# Patient Record
Sex: MALE | Race: WHITE | NOT HISPANIC OR LATINO | URBAN - NONMETROPOLITAN AREA
[De-identification: names, ages, dates, MRNs, and addresses within clinical notes are randomized per-mention and may not be internally consistent; named-entity substitution may affect disease eponyms.]

---

## 2019-12-31 ENCOUNTER — OFFICE VISIT (OUTPATIENT)
Dept: URGENT CARE | Facility: PHYSICIAN GROUP | Age: 19
End: 2019-12-31
Payer: COMMERCIAL

## 2019-12-31 VITALS
WEIGHT: 298 LBS | DIASTOLIC BLOOD PRESSURE: 86 MMHG | TEMPERATURE: 98.4 F | HEIGHT: 75 IN | OXYGEN SATURATION: 98 % | RESPIRATION RATE: 16 BRPM | HEART RATE: 76 BPM | SYSTOLIC BLOOD PRESSURE: 118 MMHG | BODY MASS INDEX: 37.05 KG/M2

## 2019-12-31 DIAGNOSIS — J22 LOWER RESP. TRACT INFECTION: ICD-10-CM

## 2019-12-31 DIAGNOSIS — J06.9 VIRAL UPPER RESPIRATORY TRACT INFECTION: ICD-10-CM

## 2019-12-31 PROCEDURE — 99204 OFFICE O/P NEW MOD 45 MIN: CPT | Performed by: NURSE PRACTITIONER

## 2019-12-31 RX ORDER — GUAIFENESIN 600 MG/1
600 TABLET, EXTENDED RELEASE ORAL EVERY 12 HOURS
Qty: 28 TAB | Refills: 0 | Status: SHIPPED | OUTPATIENT
Start: 2019-12-31

## 2019-12-31 RX ORDER — AZITHROMYCIN 250 MG/1
TABLET, FILM COATED ORAL
Qty: 6 TAB | Refills: 0 | Status: SHIPPED | OUTPATIENT
Start: 2019-12-31

## 2019-12-31 ASSESSMENT — ENCOUNTER SYMPTOMS
RHINORRHEA: 1
WHEEZING: 0
COUGH: 1
SHORTNESS OF BREATH: 1
FEVER: 0
HEMOPTYSIS: 0
SORE THROAT: 1

## 2020-01-01 NOTE — PATIENT INSTRUCTIONS
"Upper Respiratory Infection, Adult  Most upper respiratory infections (URIs) are caused by a virus. A URI affects the nose, throat, and upper air passages. The most common type of URI is often called \"the common cold.\"  Follow these instructions at home:  · Take medicines only as told by your doctor.  · Gargle warm saltwater or take cough drops to comfort your throat as told by your doctor.  · Use a warm mist humidifier or inhale steam from a shower to increase air moisture. This may make it easier to breathe.  · Drink enough fluid to keep your pee (urine) clear or pale yellow.  · Eat soups and other clear broths.  · Have a healthy diet.  · Rest as needed.  · Go back to work when your fever is gone or your doctor says it is okay.  ¨ You may need to stay home longer to avoid giving your URI to others.  ¨ You can also wear a face mask and wash your hands often to prevent spread of the virus.  · Use your inhaler more if you have asthma.  · Do not use any tobacco products, including cigarettes, chewing tobacco, or electronic cigarettes. If you need help quitting, ask your doctor.  Contact a doctor if:  · You are getting worse, not better.  · Your symptoms are not helped by medicine.  · You have chills.  · You are getting more short of breath.  · You have brown or red mucus.  · You have yellow or brown discharge from your nose.  · You have pain in your face, especially when you bend forward.  · You have a fever.  · You have puffy (swollen) neck glands.  · You have pain while swallowing.  · You have white areas in the back of your throat.  Get help right away if:  · You have very bad or constant:  ¨ Headache.  ¨ Ear pain.  ¨ Pain in your forehead, behind your eyes, and over your cheekbones (sinus pain).  ¨ Chest pain.  · You have long-lasting (chronic) lung disease and any of the following:  ¨ Wheezing.  ¨ Long-lasting cough.  ¨ Coughing up blood.  ¨ A change in your usual mucus.  · You have a stiff neck.  · You have " changes in your:  ¨ Vision.  ¨ Hearing.  ¨ Thinking.  ¨ Mood.  This information is not intended to replace advice given to you by your health care provider. Make sure you discuss any questions you have with your health care provider.  Document Released: 06/05/2009 Document Revised: 08/20/2017 Document Reviewed: 03/25/2015  [employer] Patient Education © 2017 [employer]      Bronchitis  Bronchitis is the body's way of reacting to injury and/or infection (inflammation) of the bronchi. Bronchi are the air tubes that extend from the windpipe into the lungs. If the inflammation becomes severe, it may cause shortness of breath.  CAUSES   Inflammation may be caused by:  · A virus.  · Germs (bacteria).  · Dust.  · Allergens.  · Pollutants and many other irritants.  The cells lining the bronchial tree are covered with tiny hairs (cilia). These constantly beat upward, away from the lungs, toward the mouth. This keeps the lungs free of pollutants. When these cells become too irritated and are unable to do their job, mucus begins to develop. This causes the characteristic cough of bronchitis. The cough clears the lungs when the cilia are unable to do their job. Without either of these protective mechanisms, the mucus would settle in the lungs. Then you would develop pneumonia.  Smoking is a common cause of bronchitis and can contribute to pneumonia. Stopping this habit is the single most important thing you can do to help yourself.  TREATMENT   · Your caregiver may prescribe an antibiotic if the cough is caused by bacteria. Also, medicines that open up your airways make it easier to breathe. Your caregiver may also recommend or prescribe an expectorant. It will loosen the mucus to be coughed up. Only take over-the-counter or prescription medicines for pain, discomfort, or fever as directed by your caregiver.  · Removing whatever causes the problem (smoking, for example) is critical to preventing the problem from  getting worse.  · Cough suppressants may be prescribed for relief of cough symptoms.  · Inhaled medicines may be prescribed to help with symptoms now and to help prevent problems from returning.  · For those with recurrent (chronic) bronchitis, there may be a need for steroid medicines.  SEEK IMMEDIATE MEDICAL CARE IF:   · During treatment, you develop more pus-like mucus (purulent sputum).  · You have a fever.  · Your baby is older than 3 months with a rectal temperature of 102° F (38.9° C) or higher.  · Your baby is 3 months old or younger with a rectal temperature of 100.4° F (38° C) or higher.  · You become progressively more ill.  · You have increased difficulty breathing, wheezing, or shortness of breath.  It is necessary to seek immediate medical care if you are elderly or sick from any other disease.  MAKE SURE YOU:   · Understand these instructions.  · Will watch your condition.  · Will get help right away if you are not doing well or get worse.  Document Released: 12/18/2006 Document Revised: 03/11/2013 Document Reviewed: 10/27/2009  [employer]® Patient Information ©2014 [employer].

## 2020-01-01 NOTE — PROGRESS NOTES
Subjective:     Cholo Meyer is a 19 y.o. male who presents for Cough (X 5 days)      Cough x 5 days. Has coughing attacks. Sore throat, pain 4/10. SOB with coughing. Not taking any  medications for symptoms. Brother had PNA and hospitalized.     Cough   This is a new problem. The current episode started in the past 7 days. The problem has been gradually worsening. The cough is productive of sputum. Associated symptoms include nasal congestion, postnasal drip, rhinorrhea, a sore throat and shortness of breath. Pertinent negatives include no chest pain, ear congestion, ear pain, fever, hemoptysis, rash or wheezing. He has tried nothing for the symptoms. There is no history of asthma, bronchitis or pneumonia.       History reviewed. No pertinent past medical history.    History reviewed. No pertinent surgical history.    Social History     Socioeconomic History   • Marital status: Unknown     Spouse name: Not on file   • Number of children: Not on file   • Years of education: Not on file   • Highest education level: Not on file   Occupational History   • Not on file   Social Needs   • Financial resource strain: Not on file   • Food insecurity:     Worry: Not on file     Inability: Not on file   • Transportation needs:     Medical: Not on file     Non-medical: Not on file   Tobacco Use   • Smoking status: Never Smoker   • Smokeless tobacco: Never Used   Substance and Sexual Activity   • Alcohol use: Not on file   • Drug use: Not on file   • Sexual activity: Not on file   Lifestyle   • Physical activity:     Days per week: Not on file     Minutes per session: Not on file   • Stress: Not on file   Relationships   • Social connections:     Talks on phone: Not on file     Gets together: Not on file     Attends Mosque service: Not on file     Active member of club or organization: Not on file     Attends meetings of clubs or organizations: Not on file     Relationship status: Not on file   • Intimate partner  "violence:     Fear of current or ex partner: Not on file     Emotionally abused: Not on file     Physically abused: Not on file     Forced sexual activity: Not on file   Other Topics Concern   • Not on file   Social History Narrative   • Not on file        History reviewed. No pertinent family history.     No Known Allergies    Review of Systems   Constitutional: Negative for fever.   HENT: Positive for congestion, postnasal drip, rhinorrhea and sore throat. Negative for ear discharge and ear pain.    Respiratory: Positive for cough, sputum production and shortness of breath. Negative for hemoptysis, wheezing and stridor.    Cardiovascular: Negative for chest pain.   Skin: Negative for rash.   All other systems reviewed and are negative.       Objective:   /86 (BP Location: Right arm, Patient Position: Sitting, BP Cuff Size: Large adult)   Pulse 76   Temp 36.9 °C (98.4 °F) (Oral)   Resp 16   Ht 1.905 m (6' 3\")   Wt (!) 135.2 kg (298 lb)   SpO2 98%   BMI 37.25 kg/m²     Physical Exam  Vitals signs reviewed.   Constitutional:       General: He is not in acute distress.     Appearance: Normal appearance. He is well-developed.   HENT:      Head: Normocephalic and atraumatic.      Right Ear: External ear normal.      Left Ear: External ear normal.      Nose: Mucosal edema present.      Mouth/Throat:      Mouth: Mucous membranes are moist.      Pharynx: Uvula midline. Posterior oropharyngeal erythema present. No pharyngeal swelling, oropharyngeal exudate or uvula swelling.   Eyes:      Conjunctiva/sclera: Conjunctivae normal.   Neck:      Musculoskeletal: Normal range of motion and neck supple. No neck rigidity.   Cardiovascular:      Rate and Rhythm: Normal rate and regular rhythm.      Heart sounds: Normal heart sounds.   Pulmonary:      Effort: Pulmonary effort is normal. No accessory muscle usage, prolonged expiration, respiratory distress or retractions.      Breath sounds: Normal breath sounds. No " stridor or decreased air movement. No decreased breath sounds, wheezing, rhonchi or rales.      Comments: Cough noted.   Musculoskeletal: Normal range of motion.   Lymphadenopathy:      Head:      Right side of head: No submental, submandibular, tonsillar, preauricular, posterior auricular or occipital adenopathy.      Left side of head: No submental, submandibular, tonsillar, preauricular, posterior auricular or occipital adenopathy.   Skin:     General: Skin is warm and dry.      Findings: No rash.   Neurological:      General: No focal deficit present.      Mental Status: He is alert and oriented to person, place, and time.      GCS: GCS eye subscore is 4. GCS verbal subscore is 5. GCS motor subscore is 6.   Psychiatric:         Mood and Affect: Mood normal.         Speech: Speech normal.         Behavior: Behavior normal.         Thought Content: Thought content normal.         Judgment: Judgment normal.         Assessment/Plan:   1. Viral upper respiratory tract infection  - guaiFENesin ER (MUCINEX) 600 MG TABLET SR 12 HR; Take 1 Tab by mouth every 12 hours.  Dispense: 28 Tab; Refill: 0    2. Lower resp. tract infection  - azithromycin (ZITHROMAX) 250 MG Tab; Take 500 mg day one, 250 mg days 2-5.  Dispense: 6 Tab; Refill: 0  -Discussed viral etiology of URI.     Symptomatic care.  -Oral hydration and rest.   -Over the counter expectorant as directed; Guaifenesin (Mucinex).  -Diphenhydramine as directed for rhinorrhea (runny nose) and sneezing.  --May take over the counter pseudoephedrine for nasal congestion. Can increase your blood pressure.  -Tylenol or ibuprofen for pain and fever as directed.   -Saline nasal spray as a decongestant.    Follow up for shortness of breath, fevers, elevated heart rate, weakness, prolonged cough, chest pain, or any other concerns.    -It is not recommended that you fill the antibiotic unless you start to feel worse in 3-5 days because at this time your illness is most likely  viral and taking an antibiotic will not help.    -An antibiotic was also prescribed with instructions that pt should wait several days before filling and if symptoms do not improve they can then fill prescription. Pt understands that at this time it is unlikely the symptoms are of bacterial origin and agrees with the plan. Pt also understands the risks associated with taking abx for viral infections.    Differential diagnosis, natural history, supportive care, and indications for immediate follow-up discussed.

## 2020-01-05 ASSESSMENT — ENCOUNTER SYMPTOMS
STRIDOR: 0
SPUTUM PRODUCTION: 1

## 2025-08-04 ENCOUNTER — OFFICE VISIT (OUTPATIENT)
Dept: URGENT CARE | Facility: CLINIC | Age: 25
End: 2025-08-04

## 2025-08-04 VITALS
SYSTOLIC BLOOD PRESSURE: 122 MMHG | DIASTOLIC BLOOD PRESSURE: 80 MMHG | RESPIRATION RATE: 16 BRPM | HEIGHT: 75 IN | WEIGHT: 301 LBS | OXYGEN SATURATION: 98 % | HEART RATE: 78 BPM | TEMPERATURE: 98.3 F | BODY MASS INDEX: 37.42 KG/M2

## 2025-08-04 DIAGNOSIS — Z02.1 PRE-EMPLOYMENT HEALTH SCREENING EXAMINATION: ICD-10-CM

## 2025-08-04 DIAGNOSIS — Z02.1 PRE-EMPLOYMENT DRUG SCREENING: Primary | ICD-10-CM

## 2025-08-04 LAB
AMP AMPHETAMINE: NORMAL
BAR BARBITURATES: NORMAL
BZO BENZODIAZEPINES: NORMAL
COC COCAINE: NORMAL
INT CON NEG: NORMAL
INT CON POS: NORMAL
MDMA ECSTASY: NORMAL
MET METHAMPHETAMINES: NORMAL
MTD METHADONE: NORMAL
OPI OPIATES: NORMAL
OXY OXYCODONE: NORMAL
PCP PHENCYCLIDINE: NORMAL
POC URINE DRUG SCREEN OCDRS: NEGATIVE
THC: NORMAL

## 2025-08-04 PROCEDURE — 8915 PR COMPREHENSIVE PHYSICAL: Performed by: NURSE PRACTITIONER

## 2025-08-04 PROCEDURE — 80305 DRUG TEST PRSMV DIR OPT OBS: CPT | Performed by: NURSE PRACTITIONER

## 2025-08-04 ASSESSMENT — ENCOUNTER SYMPTOMS
RESPIRATORY NEGATIVE: 1
CONSTITUTIONAL NEGATIVE: 1
PSYCHIATRIC NEGATIVE: 1
NEUROLOGICAL NEGATIVE: 1
GASTROINTESTINAL NEGATIVE: 1
CARDIOVASCULAR NEGATIVE: 1
MUSCULOSKELETAL NEGATIVE: 1
EYES NEGATIVE: 1